# Patient Record
Sex: MALE | Race: WHITE | NOT HISPANIC OR LATINO | Employment: UNEMPLOYED | URBAN - METROPOLITAN AREA
[De-identification: names, ages, dates, MRNs, and addresses within clinical notes are randomized per-mention and may not be internally consistent; named-entity substitution may affect disease eponyms.]

---

## 2020-08-03 PROBLEM — R07.9 CHEST PAIN: Status: ACTIVE | Noted: 2020-08-03

## 2020-08-04 PROBLEM — B18.2 CHRONIC HEPATITIS C VIRUS INFECTION: Status: ACTIVE | Noted: 2020-08-04

## 2020-08-04 PROBLEM — B18.2 CHRONIC HEPATITIS C WITHOUT HEPATIC COMA: Status: ACTIVE | Noted: 2020-08-04

## 2020-08-04 PROBLEM — E66.09 CLASS 2 OBESITY DUE TO EXCESS CALORIES WITHOUT SERIOUS COMORBIDITY WITH BODY MASS INDEX (BMI) OF 39.0 TO 39.9 IN ADULT: Status: ACTIVE | Noted: 2020-08-04

## 2020-08-04 PROBLEM — E11.59 TYPE 2 DIABETES MELLITUS WITH CIRCULATORY DISORDER, WITH LONG-TERM CURRENT USE OF INSULIN: Status: ACTIVE | Noted: 2020-08-04

## 2020-08-04 PROBLEM — I21.21 STEMI INVOLVING LEFT CIRCUMFLEX CORONARY ARTERY: Status: ACTIVE | Noted: 2020-08-03

## 2020-08-04 PROBLEM — Z79.4 TYPE 2 DIABETES MELLITUS WITH CIRCULATORY DISORDER, WITH LONG-TERM CURRENT USE OF INSULIN: Status: ACTIVE | Noted: 2020-08-04

## 2020-08-04 PROBLEM — I10 HTN (HYPERTENSION): Status: ACTIVE | Noted: 2020-08-04

## 2020-08-06 PROBLEM — R10.9 ABDOMINAL PAIN: Status: ACTIVE | Noted: 2020-08-06

## 2020-08-06 PROBLEM — I48.92 ATRIAL FLUTTER: Status: ACTIVE | Noted: 2020-08-06

## 2020-08-06 PROBLEM — R06.02 SOB (SHORTNESS OF BREATH): Status: ACTIVE | Noted: 2020-08-06

## 2020-08-16 ENCOUNTER — NURSE TRIAGE (OUTPATIENT)
Dept: ADMINISTRATIVE | Facility: CLINIC | Age: 65
End: 2020-08-16

## 2020-10-28 ENCOUNTER — NURSE TRIAGE (OUTPATIENT)
Dept: ADMINISTRATIVE | Facility: CLINIC | Age: 65
End: 2020-10-28

## 2020-10-28 NOTE — TELEPHONE ENCOUNTER
Post procedural symptome tracker call - No contact pt is an inmate at correctional facility, no follow up needed    Reason for Disposition   Caller has cancelled the call before the first contact    Protocols used: NO CONTACT OR DUPLICATE CONTACT CALL-A-AH

## 2021-08-06 PROBLEM — I21.21 STEMI INVOLVING LEFT CIRCUMFLEX CORONARY ARTERY: Status: RESOLVED | Noted: 2020-08-03 | Resolved: 2021-08-06
